# Patient Record
Sex: FEMALE | Race: ASIAN | Employment: STUDENT | URBAN - METROPOLITAN AREA
[De-identification: names, ages, dates, MRNs, and addresses within clinical notes are randomized per-mention and may not be internally consistent; named-entity substitution may affect disease eponyms.]

---

## 2019-06-06 ENCOUNTER — OFFICE VISIT (OUTPATIENT)
Dept: DERMATOLOGY | Facility: CLINIC | Age: 24
End: 2019-06-06

## 2019-06-06 DIAGNOSIS — L29.9 PRURITUS: Primary | ICD-10-CM

## 2019-06-06 DIAGNOSIS — L80 VITILIGO: ICD-10-CM

## 2019-06-06 DIAGNOSIS — L71.9 ROSACEA: ICD-10-CM

## 2019-06-06 DIAGNOSIS — L29.9 PRURITUS: ICD-10-CM

## 2019-06-06 LAB
BASOPHILS # BLD AUTO: 0.1 10E9/L (ref 0–0.2)
BASOPHILS NFR BLD AUTO: 0.8 %
CRP SERPL-MCNC: 14.1 MG/L (ref 0–8)
DIFFERENTIAL METHOD BLD: NORMAL
EOSINOPHIL # BLD AUTO: 0.1 10E9/L (ref 0–0.7)
EOSINOPHIL NFR BLD AUTO: 1.3 %
ERYTHROCYTE [DISTWIDTH] IN BLOOD BY AUTOMATED COUNT: 13.7 % (ref 10–15)
FERRITIN SERPL-MCNC: 232 NG/ML (ref 12–150)
HCT VFR BLD AUTO: 45.7 % (ref 35–47)
HGB BLD-MCNC: 14.7 G/DL (ref 11.7–15.7)
IMM GRANULOCYTES # BLD: 0 10E9/L (ref 0–0.4)
IMM GRANULOCYTES NFR BLD: 0.1 %
LYMPHOCYTES # BLD AUTO: 1.8 10E9/L (ref 0.8–5.3)
LYMPHOCYTES NFR BLD AUTO: 25.5 %
MCH RBC QN AUTO: 28.9 PG (ref 26.5–33)
MCHC RBC AUTO-ENTMCNC: 32.2 G/DL (ref 31.5–36.5)
MCV RBC AUTO: 90 FL (ref 78–100)
MONOCYTES # BLD AUTO: 0.6 10E9/L (ref 0–1.3)
MONOCYTES NFR BLD AUTO: 7.9 %
NEUTROPHILS # BLD AUTO: 4.6 10E9/L (ref 1.6–8.3)
NEUTROPHILS NFR BLD AUTO: 64.4 %
NRBC # BLD AUTO: 0 10*3/UL
NRBC BLD AUTO-RTO: 0 /100
PLATELET # BLD AUTO: 209 10E9/L (ref 150–450)
RBC # BLD AUTO: 5.08 10E12/L (ref 3.8–5.2)
TSH SERPL DL<=0.005 MIU/L-ACNC: 0.89 MU/L (ref 0.4–4)
WBC # BLD AUTO: 7.2 10E9/L (ref 4–11)

## 2019-06-06 RX ORDER — TACROLIMUS 1 MG/G
OINTMENT TOPICAL 2 TIMES DAILY
Qty: 60 G | Refills: 3 | Status: SHIPPED | OUTPATIENT
Start: 2019-06-06 | End: 2019-07-05

## 2019-06-06 RX ORDER — METRONIDAZOLE 7.5 MG/G
GEL TOPICAL 2 TIMES DAILY
Qty: 45 G | Refills: 1 | Status: SHIPPED | OUTPATIENT
Start: 2019-06-06 | End: 2019-08-05

## 2019-06-06 RX ORDER — IBUPROFEN 400 MG/1
400 TABLET, FILM COATED ORAL EVERY 6 HOURS PRN
COMMUNITY

## 2019-06-06 RX ORDER — HYDROXYZINE HYDROCHLORIDE 10 MG/1
TABLET, FILM COATED ORAL
Qty: 50 TABLET | Refills: 1 | Status: SHIPPED | OUTPATIENT
Start: 2019-06-06

## 2019-06-06 NOTE — LETTER
Date:June 7, 2019      Patient was self referred, no letter generated. Do not send.        [facility] Information

## 2019-06-06 NOTE — PROGRESS NOTES
Chief Complaint   Patient presents with     Derm Problem     sores on hands/arms and feels like something is crawling in hair.          Sores on hands and arms. Been using liquid bandage on them. She has had them for 1 week. Two days ago has a sensation of something is crawling in her hair. Two weeks ago went to ER and had ear infection was given ibuprofen for pain. Also has white patches on face that has occurred 6 months ago.      Kellen Sofia RN

## 2019-06-06 NOTE — PROGRESS NOTES
[facility] Dermatology Note      Dermatology Problem List:    Specialty Problems     None          CC:   Derm Problem (sores on hands/arms and feels like something is crawling in hair. )        Encounter Date: Jun 6, 2019    History of Present Illness:  Ms. Jose Raul Colon is a 23 year old female who presents as a referral from Self.    Patient has since about 2 weeks feeling of parasites on face and some erosive lesions on the forearms (felt itchy and scratched open).  In addition since about 6months on forehead and right chin area and medial part of lower lip depigmentations --> got from mother in [place] some oral medication and gained weight with this medication. Stopped that treatment about 2 months ago    Past Medical History:   There is no problem list on file for this patient.    No past medical history on file.    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            Family History:  No family history on file.    Medications:  Current Outpatient Medications   Medication Sig Dispense Refill     ibuprofen (ADVIL/MOTRIN) 400 MG tablet Take 400 mg by mouth every 6 hours as needed for moderate pain           Review of Systems:  -As per HPI  -Constitutional: The patient denies fatigue, fevers, chills, unintended weight loss, and night sweats.  -HEENT: Patient denies nonhealing oral sores.  -Skin: As above in HPI. No additional skin concerns.    Physical exam:  Vitals: There were no vitals taken for this visit.  GEN: This is a well developed, well-nourished female in no acute distress, in a pleasant mood.    SKIN: Waist-up skin, which includes the head/face, neck, both arms, chest, back, abdomen, digits and/or nails was examined.  Depigmented areas on forehead (6x2.5cm), right chin area 3-4 lesions (biggest about 1cm diameter),  middle part of lower lip  On the face a few papules, that could indicate an initial Rosacea  On the forearms about  3 each erosive lesions, but no primary lesions  -No other lesions of concern on areas examined.     Impression/Plan:    1) Vitiligo on head    Protopic ointment 1-2 times daily on lesions (--> maybe later consider Excimer Laser treatment)    Today CBC, TSH, CRP, JENNIFER, Fe    2) artefect and pruritus on arms and face    Hydroxyzine 10-20mg    3) Rosacea face    Metrogel 2 times daily and Vanicream facial wash      Follow-up in 4 weeks    I spent a total of 20 minutes face to face with Jose Raul Colon during today s office visit. Over 50% of this time was spent counseling the patient and/or coordinating care.  Please see Assessment and Plan for details.

## 2019-06-06 NOTE — LETTER
6/6/2019       RE: Jose Raul Colon  [address]  [place] [address]     Dear Colleague,    Thank you for referring your patient, Jose Raul Colon, to the [facility] DERMATOLOGY at [facility]. Please see a copy of my visit note below.    Chief Complaint   Patient presents with     Derm Problem     sores on hands/arms and feels like something is crawling in hair.          Sores on hands and arms. Been using liquid bandage on them. She has had them for 1 week. Two days ago has a sensation of something is crawling in her hair. Two weeks ago went to ER and had ear infection was given ibuprofen for pain. Also has white patches on face that has occurred 6 months ago.      Kellen Sofia, NOAH      [facility] Dermatology Note      Dermatology Problem List:    Specialty Problems     None          CC:   Derm Problem (sores on hands/arms and feels like something is crawling in hair. )        Encounter Date: Jun 6, 2019    History of Present Illness:  Ms. Jose Raul Colon is a 23 year old female who presents as a referral from Self.    Patient has since about 2 weeks feeling of parasites on face and some erosive lesions on the forearms (felt itchy and scratched open).  In addition since about 6months on forehead and right chin area and medial part of lower lip depigmentations --> got from mother in [place] some oral medication and gained weight with this medication. Stopped that treatment about 2 months ago    Past Medical History:   There is no problem list on file for this patient.    No past medical history on file.    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.            Family History:  No family history on file.    Medications:  Current Outpatient Medications   Medication Sig Dispense Refill     ibuprofen (ADVIL/MOTRIN) 400 MG tablet Take 400 mg by mouth every 6 hours as needed for moderate pain           Review of  Systems:  -As per HPI  -Constitutional: The patient denies fatigue, fevers, chills, unintended weight loss, and night sweats.  -HEENT: Patient denies nonhealing oral sores.  -Skin: As above in HPI. No additional skin concerns.    Physical exam:  Vitals: There were no vitals taken for this visit.  GEN: This is a well developed, well-nourished female in no acute distress, in a pleasant mood.    SKIN: Waist-up skin, which includes the head/face, neck, both arms, chest, back, abdomen, digits and/or nails was examined.  Depigmented areas on forehead (6x2.5cm), right chin area 3-4 lesions (biggest about 1cm diameter),  middle part of lower lip  On the face a few papules, that could indicate an initial Rosacea  On the forearms about 3 each erosive lesions, but no primary lesions  -No other lesions of concern on areas examined.     Impression/Plan:    1) Vitiligo on head    Protopic ointment 1-2 times daily on lesions (--> maybe later consider Excimer Laser treatment)    Today CBC, TSH, CRP, JENNIFER, Fe    2) artefect and pruritus on arms and face    Hydroxyzine 10-20mg    3) Rosacea face    Metrogel 2 times daily and Vanicream facial wash      Follow-up in 4 weeks    I spent a total of 20 minutes face to face with Jose Raul Colon during today s office visit. Over 50% of this time was spent counseling the patient and/or coordinating care.  Please see Assessment and Plan for details.      Again, thank you for allowing me to participate in the care of your patient.      Sincerely,    Brandon Marques MD

## 2019-06-07 LAB — ANA SER QL IF: NEGATIVE

## 2019-07-05 ENCOUNTER — OFFICE VISIT (OUTPATIENT)
Dept: DERMATOLOGY | Facility: CLINIC | Age: 24
End: 2019-07-05

## 2019-07-05 ENCOUNTER — TELEPHONE (OUTPATIENT)
Dept: DERMATOLOGY | Facility: CLINIC | Age: 24
End: 2019-07-05

## 2019-07-05 DIAGNOSIS — L80 VITILIGO: Primary | ICD-10-CM

## 2019-07-05 RX ORDER — TACROLIMUS 1 MG/G
OINTMENT TOPICAL 2 TIMES DAILY
Qty: 60 G | Refills: 3 | Status: SHIPPED | OUTPATIENT
Start: 2019-07-05

## 2019-07-05 ASSESSMENT — PAIN SCALES - GENERAL: PAINLEVEL: NO PAIN (0)

## 2019-07-05 NOTE — PROGRESS NOTES
[facility] Dermatology Note      Dermatology Problem List:  1. Vitiligo of head - Current Tx: NBUVB therapy, protopic ointment  -Previous Tx: Laser treatment in [place], Chinese herbs, protopic ointment   2. Artefect and Pruritus on arms and face - Hydroxyzine 10-20mg - 6/6/2019  3. Rosacea on face - Metrogel - 6/6/2019    Encounter Date: Jul 5, 2019    CC:  Chief Complaint   Patient presents with     Derm Problem     fannie Blunt seen today for a follow up, vitiligo, as reported by patient.         History of Present Illness:  Ms. Jose Raul Colon is a 24 year old female who is present as a follow-up for vitiligo. Patient was last seen by Dr. Marques on 6/62019 when she started Protopic ointment. Labs were drawn and of note was: elevated Ferritin at 232 (normal is ) and elevated CRP of 14.1 (normal 0-8).  She is present today with a . Patient is concerned with vitiligo on her forehead and chin. Vitiligo was first noticed 6 months ago an initially was just on her forehead but then spread to chin, neck and a little along the left hairline at the back of the neck. She uses her Protopic ointment daily and has not noticed a change in appearance. Does not note any new spots since her last visit. Patient states she was using an oriental herb mixed with water that was orally consumed after being prescribed by [place] doctors. She did not notice any change in her condition after taking this mix. Patient states she received laser treatment 3-5 times in [place], but treatment was unsuccessful. She explains that her current insurance coverage ends at the end of July, but would like to know if her insurance covers light therapy as she is interested in this. She is otherwise in her general state of health.    Past Medical History:   There is no problem list on file for this patient.    No past medical history on file.  No past surgical history on file.    Social History:   reports that she has  never smoked. She has never used smokeless tobacco. She reports that she drank alcohol.    Family History:  No family history on file.    Medications:  Current Outpatient Medications   Medication Sig Dispense Refill     hydrOXYzine (ATARAX) 10 MG tablet Between 10-20mg in the evening against itching 50 tablet 1     ibuprofen (ADVIL/MOTRIN) 400 MG tablet Take 400 mg by mouth every 6 hours as needed for moderate pain       metroNIDAZOLE (METROGEL) 0.75 % external gel Apply topically 2 times daily On central parts of face 45 g 1     tacrolimus (PROTOPIC) 0.1 % external ointment Apply topically 2 times daily On depigmented lesions on face 60 g 3       No Known Allergies    Review of Systems:  -Constitutional: The patient denies fatigue, fevers, chills, unintended weight loss, and night sweats.  -HEENT: Patient denies nonhealing oral sores.  -Skin: As above in HPI. No additional skin concerns.    Physical exam:  Vitals: There were no vitals taken for this visit.  GEN: This is a well developed, well-nourished female in no acute distress, in a pleasant mood.    SKIN: Focused examination of the face, head, neck, chest and hands was performed.  -4x5cm hypopigmented, well demarcated patch on central forehead  -1cm hypopigmented patch under right side of chin  -8mm subtle hypopigmented patch on central chin  -Hypopigmentation with a few white hairs growing out along left occipital hairline   -No other lesions of concern on areas examined.       Impression/Plan:  1. Vitiligo on head    Educated on the etiology    Reviewed labs obtained on 6/6/2019 (CBC, TSH, CRP, JENNIFER, Fe) with patient    Continue Protopic ointment 1-2 times daily on lesions, refill provided today    Recommended patient to send [employer] message if new lesions appear as would consider oral prednisone potentially - however to me it sounds like this has slowed or stopped progressing for her    Discussed possible treatment options including topical medications, laser  treatment and light therapy - patient had laser therapy in [place] and reports it did not help.  Will start phototherapy(nbUVB) protocol pending insurance approval, two to three times weekly, increasing dose as tolerated. Will initiate tx at 100mJ.  Maximum dose: 3000mJ/cm2  Consent form signed today. Medication list reviewed and no photosensitizing medication was noted.  Discussed all risks and side effects of phototherapy with patient, including burning, a worsening of her condition, scarring, pain, eye damage, photoaging and risk of skin cancer.  Will recheck patient in 4 weeks    CC Dr. Velarde on close of this encounter.  Follow-up prn for new or changing lesions.       Staff Involved:  Staff/Scribe    Scribe Disclosure:  I, Deng Menendez, am serving as a scribe to document services personally performed by Bhumika Mccain PA-C, based on data collection and the provider's statements to me.     Provider Disclosure:   The documentation recorded by the scribe accurately reflects the services I personally performed and the decisions made by me.    All risks, benefits and alternatives were discussed with patient.  Patient is in agreement and understands the assessment and plan.  All questions were answered.    Bhumika Mccain PA-C  [facility]: Phone: [phone], Fax: [phone]

## 2019-07-05 NOTE — LETTER
7/5/2019       RE: Jose Raul Colon  [address]  [place] [address]     Dear Colleague,    Thank you for referring your patient, Jose Raul Colon, to the [facility] DERMATOLOGY at [facility]. Please see a copy of my visit note below.    [facility] Dermatology Note      Dermatology Problem List:  1. Vitiligo of head - Current Tx: NBUVB therapy, protopic ointment  -Previous Tx: Laser treatment in [place], Chinese herbs, protopic ointment   2. Artefect and Pruritus on arms and face - Hydroxyzine 10-20mg - 6/6/2019  3. Rosacea on face - Metrogel - 6/6/2019    Encounter Date: Jul 5, 2019    CC:  Chief Complaint   Patient presents with     Derm Problem     Jose Raul, is ruth seen today for a follow up, vitiligo, as reported by patient.         History of Present Illness:  Ms. Jose Raul Colon is a 24 year old female who is present as a follow-up for vitiligo. Patient was last seen by Dr. Marques on 6/62019 when she started Protopic ointment. Labs were drawn and of note was: elevated Ferritin at 232 (normal is ) and elevated CRP of 14.1 (normal 0-8).  She is present today with a . Patient is concerned with vitiligo on her forehead and chin. Vitiligo was first noticed 6 months ago an initially was just on her forehead but then spread to chin, neck and a little along the left hairline at the back of the neck. She uses her Protopic ointment daily and has not noticed a change in appearance. Does not note any new spots since her last visit. Patient states she was using an oriental herb mixed with water that was orally consumed after being prescribed by [place] doctors. She did not notice any change in her condition after taking this mix. Patient states she received laser treatment 3-5 times in [place], but treatment was unsuccessful. She explains that her current insurance coverage ends at the end of July, but would like to know if her insurance covers light therapy as she  is interested in this. She is otherwise in her general state of health.    Past Medical History:   There is no problem list on file for this patient.    No past medical history on file.  No past surgical history on file.    Social History:   reports that she has never smoked. She has never used smokeless tobacco. She reports that she drank alcohol.    Family History:  No family history on file.    Medications:  Current Outpatient Medications   Medication Sig Dispense Refill     hydrOXYzine (ATARAX) 10 MG tablet Between 10-20mg in the evening against itching 50 tablet 1     ibuprofen (ADVIL/MOTRIN) 400 MG tablet Take 400 mg by mouth every 6 hours as needed for moderate pain       metroNIDAZOLE (METROGEL) 0.75 % external gel Apply topically 2 times daily On central parts of face 45 g 1     tacrolimus (PROTOPIC) 0.1 % external ointment Apply topically 2 times daily On depigmented lesions on face 60 g 3       No Known Allergies    Review of Systems:  -Constitutional: The patient denies fatigue, fevers, chills, unintended weight loss, and night sweats.  -HEENT: Patient denies nonhealing oral sores.  -Skin: As above in HPI. No additional skin concerns.    Physical exam:  Vitals: There were no vitals taken for this visit.  GEN: This is a well developed, well-nourished female in no acute distress, in a pleasant mood.    SKIN: Focused examination of the face, head, neck, chest and hands was performed.  -4x5cm hypopigmented, well demarcated patch on central forehead  -1cm hypopigmented patch under right side of chin  -8mm subtle hypopigmented patch on central chin  -Hypopigmentation with a few white hairs growing out along left occipital hairline   -No other lesions of concern on areas examined.       Impression/Plan:  1. Vitiligo on head    Educated on the etiology    Reviewed labs obtained on 6/6/2019 (CBC, TSH, CRP, JENNIFER, Fe) with patient    Continue Protopic ointment 1-2 times daily on lesions, refill provided  today    Recommended patient to send [employer] message if new lesions appear as would consider oral prednisone potentially - however to me it sounds like this has slowed or stopped progressing for her    Discussed possible treatment options including topical medications, laser treatment and light therapy - patient had laser therapy in [place] and reports it did not help.  Will start phototherapy(nbUVB) protocol pending insurance approval, two to three times weekly, increasing dose as tolerated. Will initiate tx at 100mJ.  Maximum dose: 3000mJ/cm2  Consent form signed today. Medication list reviewed and no photosensitizing medication was noted.  Discussed all risks and side effects of phototherapy with patient, including burning, a worsening of her condition, scarring, pain, eye damage, photoaging and risk of skin cancer.  Will recheck patient in 4 weeks    CC Dr. Velarde on close of this encounter.  Follow-up prn for new or changing lesions.       Staff Involved:  Staff/Scribe    Scribe Disclosure:  I, Deng Menendez, am serving as a scribe to document services personally performed by Bhumika Mccain PA-C, based on data collection and the provider's statements to me.     Provider Disclosure:   The documentation recorded by the scribe accurately reflects the services I personally performed and the decisions made by me.    All risks, benefits and alternatives were discussed with patient.  Patient is in agreement and understands the assessment and plan.  All questions were answered.    Bhumika Mccain PA-C  [facility]: Phone: [phone], Fax: [phone]

## 2019-07-05 NOTE — NURSING NOTE
Dermatology Rooming Note    Jose Raul Colon's goals for this visit include:   Chief Complaint   Patient presents with     Derm Problem     Jose Raul, is ruth seen today for a follow up, vitiligo, as reported by patient.     Kellen Mcduffie LPN

## 2019-07-05 NOTE — TELEPHONE ENCOUNTER
hBumika Harley needs this pt to have NBUVB treatment for her vitiligo.     Thanks!     DILLON Alejandro

## 2020-03-11 ENCOUNTER — HEALTH MAINTENANCE LETTER (OUTPATIENT)
Age: 25
End: 2020-03-11

## 2021-01-03 ENCOUNTER — HEALTH MAINTENANCE LETTER (OUTPATIENT)
Age: 26
End: 2021-01-03

## 2021-04-25 ENCOUNTER — HEALTH MAINTENANCE LETTER (OUTPATIENT)
Age: 26
End: 2021-04-25

## 2021-10-10 ENCOUNTER — HEALTH MAINTENANCE LETTER (OUTPATIENT)
Age: 26
End: 2021-10-10

## 2022-05-21 ENCOUNTER — HEALTH MAINTENANCE LETTER (OUTPATIENT)
Age: 27
End: 2022-05-21

## 2022-09-18 ENCOUNTER — HEALTH MAINTENANCE LETTER (OUTPATIENT)
Age: 27
End: 2022-09-18

## 2023-06-04 ENCOUNTER — HEALTH MAINTENANCE LETTER (OUTPATIENT)
Age: 28
End: 2023-06-04